# Patient Record
Sex: FEMALE | Race: WHITE | NOT HISPANIC OR LATINO | Employment: UNEMPLOYED | URBAN - METROPOLITAN AREA
[De-identification: names, ages, dates, MRNs, and addresses within clinical notes are randomized per-mention and may not be internally consistent; named-entity substitution may affect disease eponyms.]

---
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
## 2022-12-15 ENCOUNTER — TRANSFERRED RECORDS (OUTPATIENT)
Dept: HEALTH INFORMATION MANAGEMENT | Facility: CLINIC | Age: 22
End: 2022-12-15

## 2023-03-16 PROBLEM — R20.2 PARESTHESIAS: Status: ACTIVE | Noted: 2022-10-21

## 2023-03-16 PROBLEM — M79.602 PAIN OF LEFT UPPER EXTREMITY: Status: ACTIVE | Noted: 2022-10-21

## 2024-06-13 ENCOUNTER — HOSPITAL ENCOUNTER (EMERGENCY)
Facility: CLINIC | Age: 24
Discharge: HOME | End: 2024-06-14
Attending: EMERGENCY MEDICINE | Admitting: EMERGENCY MEDICINE
Payer: COMMERCIAL

## 2024-06-13 DIAGNOSIS — R51.9 NONINTRACTABLE HEADACHE, UNSPECIFIED CHRONICITY PATTERN, UNSPECIFIED HEADACHE TYPE: ICD-10-CM

## 2024-06-13 DIAGNOSIS — R07.9 CHEST PAIN, UNSPECIFIED TYPE: ICD-10-CM

## 2024-06-13 DIAGNOSIS — I95.1 ORTHOSTATIC HYPOTENSION: ICD-10-CM

## 2024-06-13 DIAGNOSIS — H61.22 IMPACTED CERUMEN OF LEFT EAR: ICD-10-CM

## 2024-06-13 LAB
ANION GAP SERPL CALCULATED.3IONS-SCNC: 14 MMOL/L (ref 7–15)
BASOPHILS # BLD AUTO: 0 10E3/UL (ref 0–0.2)
BASOPHILS NFR BLD AUTO: 0 %
BUN SERPL-MCNC: 14.3 MG/DL (ref 6–20)
CALCIUM SERPL-MCNC: 10.2 MG/DL (ref 8.6–10)
CHLORIDE SERPL-SCNC: 100 MMOL/L (ref 98–107)
CREAT SERPL-MCNC: 0.72 MG/DL (ref 0.51–0.95)
DEPRECATED HCO3 PLAS-SCNC: 20 MMOL/L (ref 22–29)
EGFRCR SERPLBLD CKD-EPI 2021: >90 ML/MIN/1.73M2
EOSINOPHIL # BLD AUTO: 0.2 10E3/UL (ref 0–0.7)
EOSINOPHIL NFR BLD AUTO: 2 %
ERYTHROCYTE [DISTWIDTH] IN BLOOD BY AUTOMATED COUNT: 11.9 % (ref 10–15)
GLUCOSE SERPL-MCNC: 92 MG/DL (ref 70–99)
HCT VFR BLD AUTO: 44 % (ref 35–47)
HGB BLD-MCNC: 14.6 G/DL (ref 11.7–15.7)
IMM GRANULOCYTES # BLD: 0 10E3/UL
IMM GRANULOCYTES NFR BLD: 0 %
LYMPHOCYTES # BLD AUTO: 2.5 10E3/UL (ref 0.8–5.3)
LYMPHOCYTES NFR BLD AUTO: 25 %
MCH RBC QN AUTO: 29.6 PG (ref 26.5–33)
MCHC RBC AUTO-ENTMCNC: 33.2 G/DL (ref 31.5–36.5)
MCV RBC AUTO: 89 FL (ref 78–100)
MONOCYTES # BLD AUTO: 1 10E3/UL (ref 0–1.3)
MONOCYTES NFR BLD AUTO: 10 %
NEUTROPHILS # BLD AUTO: 6.4 10E3/UL (ref 1.6–8.3)
NEUTROPHILS NFR BLD AUTO: 63 %
NRBC # BLD AUTO: 0 10E3/UL
NRBC BLD AUTO-RTO: 0 /100
PLATELET # BLD AUTO: 367 10E3/UL (ref 150–450)
POTASSIUM SERPL-SCNC: 4.4 MMOL/L (ref 3.4–5.3)
RBC # BLD AUTO: 4.93 10E6/UL (ref 3.8–5.2)
SODIUM SERPL-SCNC: 134 MMOL/L (ref 135–145)
WBC # BLD AUTO: 10.2 10E3/UL (ref 4–11)

## 2024-06-13 PROCEDURE — 85025 COMPLETE CBC W/AUTO DIFF WBC: CPT | Performed by: EMERGENCY MEDICINE

## 2024-06-13 PROCEDURE — 80048 BASIC METABOLIC PNL TOTAL CA: CPT | Performed by: EMERGENCY MEDICINE

## 2024-06-13 PROCEDURE — 69209 REMOVE IMPACTED EAR WAX UNI: CPT | Mod: LT

## 2024-06-13 PROCEDURE — 36415 COLL VENOUS BLD VENIPUNCTURE: CPT | Performed by: EMERGENCY MEDICINE

## 2024-06-13 PROCEDURE — 84484 ASSAY OF TROPONIN QUANT: CPT | Performed by: EMERGENCY MEDICINE

## 2024-06-13 PROCEDURE — 84702 CHORIONIC GONADOTROPIN TEST: CPT | Performed by: EMERGENCY MEDICINE

## 2024-06-13 PROCEDURE — 93005 ELECTROCARDIOGRAM TRACING: CPT

## 2024-06-13 PROCEDURE — 99285 EMERGENCY DEPT VISIT HI MDM: CPT | Mod: 25

## 2024-06-13 ASSESSMENT — COLUMBIA-SUICIDE SEVERITY RATING SCALE - C-SSRS
6. HAVE YOU EVER DONE ANYTHING, STARTED TO DO ANYTHING, OR PREPARED TO DO ANYTHING TO END YOUR LIFE?: NO
1. IN THE PAST MONTH, HAVE YOU WISHED YOU WERE DEAD OR WISHED YOU COULD GO TO SLEEP AND NOT WAKE UP?: NO
2. HAVE YOU ACTUALLY HAD ANY THOUGHTS OF KILLING YOURSELF IN THE PAST MONTH?: NO

## 2024-06-14 ENCOUNTER — APPOINTMENT (OUTPATIENT)
Dept: GENERAL RADIOLOGY | Facility: CLINIC | Age: 24
End: 2024-06-14
Attending: EMERGENCY MEDICINE
Payer: COMMERCIAL

## 2024-06-14 VITALS
DIASTOLIC BLOOD PRESSURE: 80 MMHG | SYSTOLIC BLOOD PRESSURE: 119 MMHG | WEIGHT: 168.65 LBS | BODY MASS INDEX: 27.1 KG/M2 | OXYGEN SATURATION: 99 % | HEIGHT: 66 IN | RESPIRATION RATE: 18 BRPM | TEMPERATURE: 98.1 F | HEART RATE: 81 BPM

## 2024-06-14 LAB
ATRIAL RATE - MUSE: 80 BPM
DIASTOLIC BLOOD PRESSURE - MUSE: NORMAL MMHG
GROUP A STREP BY PCR: NOT DETECTED
HCG INTACT+B SERPL-ACNC: <1 MIU/ML
INTERPRETATION ECG - MUSE: NORMAL
P AXIS - MUSE: 52 DEGREES
PR INTERVAL - MUSE: 134 MS
QRS DURATION - MUSE: 86 MS
QT - MUSE: 374 MS
QTC - MUSE: 431 MS
R AXIS - MUSE: 47 DEGREES
SYSTOLIC BLOOD PRESSURE - MUSE: NORMAL MMHG
T AXIS - MUSE: 16 DEGREES
TROPONIN T SERPL HS-MCNC: <6 NG/L
VENTRICULAR RATE- MUSE: 80 BPM

## 2024-06-14 PROCEDURE — 87651 STREP A DNA AMP PROBE: CPT | Performed by: EMERGENCY MEDICINE

## 2024-06-14 PROCEDURE — 71046 X-RAY EXAM CHEST 2 VIEWS: CPT

## 2024-06-14 PROCEDURE — 250N000013 HC RX MED GY IP 250 OP 250 PS 637: Performed by: EMERGENCY MEDICINE

## 2024-06-14 RX ORDER — IBUPROFEN 600 MG/1
600 TABLET, FILM COATED ORAL ONCE
Status: COMPLETED | OUTPATIENT
Start: 2024-06-14 | End: 2024-06-14

## 2024-06-14 RX ADMIN — IBUPROFEN 600 MG: 600 TABLET ORAL at 00:17

## 2024-06-14 NOTE — ED PROVIDER NOTES
"  Emergency Department Note      History of Present Illness     Chief Complaint  Medication Reaction      HPI  Marquita Lopez is a 23 year old female with a history of POTS who presents to the ED for concerns for a medication reaction. The patient was placed on spironolactone 1 week ago (6/7/24) for her acne. She takes 100mg of spironolactone every morning. This morning, she states she felt tachycardic when getting out of the shower. Her heart rate measured 140, and waxed and waned throughout the day. She states that her heart rate would increase with standing, and decrease when sitting down. The patient endorses constant chest tightness throughout the day, and would have difficulty breathing when her heart rate increased. She denies any fever, chills, current chest pain, dyspnea, abdominal pain, nausea, or vomiting. She has not had any recent travel, hormone use or family history early MI. Of note, the patient has had intermittent headaches for the past week as well since starting medication.     Independent Historian  Mother present at bedside and corroborates the above.     Review of External Notes  6/3/24 ED visit  Past Medical History   Medical History and Problem List  POTS   Paresthesias     Medications  Sumatriptan  Spironolactone     Physical Exam   Patient Vitals for the past 24 hrs:   BP Temp Temp src Pulse Resp SpO2 Height Weight   06/13/24 2214 (!) 139/90 98.1  F (36.7  C) Temporal 91 18 99 % 1.676 m (5' 6\") 76.5 kg (168 lb 10.4 oz)     Physical Exam  Nursing note and vitals reviewed.  Constitutional: Well nourished.   Eyes: Conjunctiva normal.  Pupils are equal, round, and reactive to light.   ENT: Nose normal. Mucous membranes pink and moist.  L. TM not visualized initially 2/2 to cerumen impaction, removed and L. TM appears normal. R. TM normal. Minimal posterior oropharyngeal erythema, no exudate. Uvula midline.   Neck: Normal range of motion.  CVS: Normal rate, regular rhythm.  Normal heart sounds. "   Pulmonary: Lungs clear to auscultation bilaterally. No wheezes/rales/rhonchi.  GI: Abdomen soft. Nontender, nondistended. No rigidity or guarding.    MSK: Moves all extremities equally and symmetrically  Neuro: Alert. Follows simple commands.  Skin: Skin is warm and dry. No rash noted.   Psychiatric: Normal affect.     Diagnostics   Lab Results   Labs Ordered and Resulted from Time of ED Arrival to Time of ED Departure   BASIC METABOLIC PANEL - Abnormal       Result Value    Sodium 134 (*)     Potassium 4.4      Chloride 100      Carbon Dioxide (CO2) 20 (*)     Anion Gap 14      Urea Nitrogen 14.3      Creatinine 0.72      GFR Estimate >90      Calcium 10.2 (*)     Glucose 92     HCG QUANTITATIVE PREGNANCY - Normal    hCG Quantitative <1     TROPONIN T, HIGH SENSITIVITY - Normal    Troponin T, High Sensitivity <6     GROUP A STREPTOCOCCUS PCR THROAT SWAB - Normal    Group A strep by PCR Not Detected     CBC WITH PLATELETS AND DIFFERENTIAL    WBC Count 10.2      RBC Count 4.93      Hemoglobin 14.6      Hematocrit 44.0      MCV 89      MCH 29.6      MCHC 33.2      RDW 11.9      Platelet Count 367      % Neutrophils 63      % Lymphocytes 25      % Monocytes 10      % Eosinophils 2      % Basophils 0      % Immature Granulocytes 0      NRBCs per 100 WBC 0      Absolute Neutrophils 6.4      Absolute Lymphocytes 2.5      Absolute Monocytes 1.0      Absolute Eosinophils 0.2      Absolute Basophils 0.0      Absolute Immature Granulocytes 0.0      Absolute NRBCs 0.0         Imaging  XR Chest 2 Views   Final Result   IMPRESSION: Negative chest.          EKG   ECG results from 06/13/24   EKG 12 lead     Value    Systolic Blood Pressure     Diastolic Blood Pressure     Ventricular Rate 80    Atrial Rate 80    AZ Interval 134    QRS Duration 86        QTc 431    P Axis 52    R AXIS 47    T Axis 16    Interpretation ECG      Sinus rhythm  Possible Left atrial enlargement  Borderline ECG  Interpreted by me at 2226.         Independent Interpretation  I independently interpreted the chest XR, no focal pneumonia   ED Course    Medications Administered  Medications   sodium chloride 0.9% BOLUS 1,000 mL (1,000 mLs Intravenous Not Given 6/14/24 0018)   ibuprofen (ADVIL/MOTRIN) tablet 600 mg (600 mg Oral $Given 6/14/24 0017)       Procedures  Procedures     Discussion of Management  None    Social Determinants of Health adding to complexity of care  None    ED Course     Medical Decision Making / Diagnosis   CMS Diagnoses: None    MIPS     None    MDM  Marquita Lopez is a 23 year old female presenting with predominantly concerns for a medication reaction to her spironolactone.  She is nontoxic, in no significant distress on arrival.  No profound electrolyte derangements or profound anemia.  She is not pregnant.  EKG without focal ischemia or underlying arrhythmia.  Delta troponin negative.  Her presentation would be atypical of ACS.  PERC negative, clinically doubt PE.  Orthostatics positive during her time in the ED.  I did recommend IV fluids though patient is declining today and expressed preference for oral intake.  She was tolerating this without difficulty at bedside.  She also reported headaches throughout the week.  She is without focal neurodeficits or meningeal signs.  No clinical evidence to suggest strep pharyngitis.  She did have concern for cerumen impaction on exam which was irrigated and no clinical evidence to suggest otitis media.  I did discuss with patient strong concern that her orthostatic hypotension is more secondary to spironolactone initiation and simultaneously complicated by her POTS history.  I recommended she hold this medication given it is a diuretic and to discuss with her dermatologist possibly lowering the dose.  Regarding her chest pain, stronger suspicion this is more related to costochondritis.  I recommended Tylenol/Motrin as needed though to closely monitor her symptoms and should symptoms worsen  or change to promptly represent.  Plan and close PCP follow-up.      Disposition  The patient was discharged.     ICD-10 Codes:    ICD-10-CM    1. Orthostatic hypotension  I95.1       2. Nonintractable headache, unspecified chronicity pattern, unspecified headache type  R51.9       3. Chest pain, unspecified type  R07.9       4. Impacted cerumen of left ear  H61.22            Discharge Medications  There are no discharge medications for this patient.    Scribe Disclosure:  I, Akilah Geller, am serving as a scribe at 12:12 AM on 6/14/2024 to document services personally performed by Eleanor Zavala DO based on my observations and the provider's statements to me.        Eleanor Zavala DO  06/14/24 0124

## 2024-06-14 NOTE — ED TRIAGE NOTES
Prescribed spironolactone last week. Since then (with her history of POTS), heart rate has been doubling and blood pressure decreasing with activity and standing.

## 2024-06-14 NOTE — DISCHARGE INSTRUCTIONS
Recommend discontinue spironolactone at this time. Discuss with your dermatologist possibly lower dose. Monitor for palpations,increasing chest pain or should symptoms worsen to represent.

## 2024-11-24 ENCOUNTER — HEALTH MAINTENANCE LETTER (OUTPATIENT)
Age: 24
End: 2024-11-24

## 2025-06-22 ENCOUNTER — APPOINTMENT (OUTPATIENT)
Dept: GENERAL RADIOLOGY | Facility: CLINIC | Age: 25
End: 2025-06-22
Attending: EMERGENCY MEDICINE
Payer: COMMERCIAL

## 2025-06-22 ENCOUNTER — HOSPITAL ENCOUNTER (EMERGENCY)
Facility: CLINIC | Age: 25
Discharge: HOME | End: 2025-06-22
Attending: EMERGENCY MEDICINE | Admitting: EMERGENCY MEDICINE
Payer: COMMERCIAL

## 2025-06-22 VITALS
TEMPERATURE: 96.9 F | WEIGHT: 163.8 LBS | BODY MASS INDEX: 26.33 KG/M2 | OXYGEN SATURATION: 97 % | HEART RATE: 68 BPM | DIASTOLIC BLOOD PRESSURE: 65 MMHG | RESPIRATION RATE: 18 BRPM | SYSTOLIC BLOOD PRESSURE: 111 MMHG | HEIGHT: 66 IN

## 2025-06-22 DIAGNOSIS — R07.9 ACUTE CHEST PAIN: ICD-10-CM

## 2025-06-22 DIAGNOSIS — R06.02 SHORTNESS OF BREATH: ICD-10-CM

## 2025-06-22 LAB
ANION GAP SERPL CALCULATED.3IONS-SCNC: 8 MMOL/L (ref 7–15)
BASOPHILS # BLD AUTO: 0.1 10E3/UL (ref 0–0.2)
BASOPHILS NFR BLD AUTO: 1 %
BUN SERPL-MCNC: 10.4 MG/DL (ref 6–20)
CALCIUM SERPL-MCNC: 8.9 MG/DL (ref 8.8–10.4)
CHLORIDE SERPL-SCNC: 108 MMOL/L (ref 98–107)
CREAT SERPL-MCNC: 0.71 MG/DL (ref 0.51–0.95)
D DIMER PPP FEU-MCNC: 0.43 UG/ML FEU (ref 0–0.5)
EGFRCR SERPLBLD CKD-EPI 2021: >90 ML/MIN/1.73M2
EOSINOPHIL # BLD AUTO: 0.3 10E3/UL (ref 0–0.7)
EOSINOPHIL NFR BLD AUTO: 3 %
ERYTHROCYTE [DISTWIDTH] IN BLOOD BY AUTOMATED COUNT: 11.9 % (ref 10–15)
GLUCOSE SERPL-MCNC: 100 MG/DL (ref 70–99)
HCG SERPL QL: NEGATIVE
HCO3 SERPL-SCNC: 21 MMOL/L (ref 22–29)
HCT VFR BLD AUTO: 37.9 % (ref 35–47)
HGB BLD-MCNC: 13 G/DL (ref 11.7–15.7)
IMM GRANULOCYTES # BLD: 0 10E3/UL
IMM GRANULOCYTES NFR BLD: 0 %
LYMPHOCYTES # BLD AUTO: 2.7 10E3/UL (ref 0.8–5.3)
LYMPHOCYTES NFR BLD AUTO: 35 %
MCH RBC QN AUTO: 29.4 PG (ref 26.5–33)
MCHC RBC AUTO-ENTMCNC: 34.3 G/DL (ref 31.5–36.5)
MCV RBC AUTO: 86 FL (ref 78–100)
MONOCYTES # BLD AUTO: 0.6 10E3/UL (ref 0–1.3)
MONOCYTES NFR BLD AUTO: 8 %
NEUTROPHILS # BLD AUTO: 4 10E3/UL (ref 1.6–8.3)
NEUTROPHILS NFR BLD AUTO: 52 %
NRBC # BLD AUTO: 0 10E3/UL
NRBC BLD AUTO-RTO: 0 /100
PLATELET # BLD AUTO: 311 10E3/UL (ref 150–450)
POTASSIUM SERPL-SCNC: 3.5 MMOL/L (ref 3.4–5.3)
RBC # BLD AUTO: 4.42 10E6/UL (ref 3.8–5.2)
SODIUM SERPL-SCNC: 137 MMOL/L (ref 135–145)
TROPONIN T SERPL HS-MCNC: <6 NG/L
TROPONIN T SERPL HS-MCNC: <6 NG/L
WBC # BLD AUTO: 7.6 10E3/UL (ref 4–11)

## 2025-06-22 PROCEDURE — 99285 EMERGENCY DEPT VISIT HI MDM: CPT | Mod: 25

## 2025-06-22 PROCEDURE — 84484 ASSAY OF TROPONIN QUANT: CPT | Performed by: EMERGENCY MEDICINE

## 2025-06-22 PROCEDURE — 93005 ELECTROCARDIOGRAM TRACING: CPT

## 2025-06-22 PROCEDURE — 96361 HYDRATE IV INFUSION ADD-ON: CPT

## 2025-06-22 PROCEDURE — 85004 AUTOMATED DIFF WBC COUNT: CPT | Performed by: EMERGENCY MEDICINE

## 2025-06-22 PROCEDURE — 250N000011 HC RX IP 250 OP 636: Performed by: EMERGENCY MEDICINE

## 2025-06-22 PROCEDURE — 80048 BASIC METABOLIC PNL TOTAL CA: CPT | Performed by: EMERGENCY MEDICINE

## 2025-06-22 PROCEDURE — 85379 FIBRIN DEGRADATION QUANT: CPT | Performed by: EMERGENCY MEDICINE

## 2025-06-22 PROCEDURE — 258N000003 HC RX IP 258 OP 636: Performed by: EMERGENCY MEDICINE

## 2025-06-22 PROCEDURE — 36415 COLL VENOUS BLD VENIPUNCTURE: CPT | Performed by: EMERGENCY MEDICINE

## 2025-06-22 PROCEDURE — 71046 X-RAY EXAM CHEST 2 VIEWS: CPT

## 2025-06-22 PROCEDURE — 84703 CHORIONIC GONADOTROPIN ASSAY: CPT | Performed by: EMERGENCY MEDICINE

## 2025-06-22 PROCEDURE — 96374 THER/PROPH/DIAG INJ IV PUSH: CPT

## 2025-06-22 RX ORDER — FLUTICASONE PROPIONATE 50 MCG
1 SPRAY, SUSPENSION (ML) NASAL DAILY
COMMUNITY

## 2025-06-22 RX ORDER — ONDANSETRON 2 MG/ML
4 INJECTION INTRAMUSCULAR; INTRAVENOUS ONCE
Status: COMPLETED | OUTPATIENT
Start: 2025-06-22 | End: 2025-06-22

## 2025-06-22 RX ORDER — RIZATRIPTAN BENZOATE 5 MG/1
5 TABLET ORAL
COMMUNITY

## 2025-06-22 RX ORDER — CETIRIZINE HYDROCHLORIDE 10 MG/1
10 TABLET ORAL DAILY
COMMUNITY

## 2025-06-22 RX ORDER — AZELASTINE 1 MG/ML
1 SPRAY, METERED NASAL 2 TIMES DAILY
COMMUNITY

## 2025-06-22 RX ADMIN — ONDANSETRON 4 MG: 2 INJECTION, SOLUTION INTRAMUSCULAR; INTRAVENOUS at 06:46

## 2025-06-22 RX ADMIN — SODIUM CHLORIDE 1000 ML: 0.9 INJECTION, SOLUTION INTRAVENOUS at 06:45

## 2025-06-22 ASSESSMENT — ACTIVITIES OF DAILY LIVING (ADL)
ADLS_ACUITY_SCORE: 41

## 2025-06-22 ASSESSMENT — COLUMBIA-SUICIDE SEVERITY RATING SCALE - C-SSRS
1. IN THE PAST MONTH, HAVE YOU WISHED YOU WERE DEAD OR WISHED YOU COULD GO TO SLEEP AND NOT WAKE UP?: NO
6. HAVE YOU EVER DONE ANYTHING, STARTED TO DO ANYTHING, OR PREPARED TO DO ANYTHING TO END YOUR LIFE?: NO
2. HAVE YOU ACTUALLY HAD ANY THOUGHTS OF KILLING YOURSELF IN THE PAST MONTH?: NO

## 2025-06-22 NOTE — DISCHARGE INSTRUCTIONS
Please monitor symptoms closely.  I would recommend ensuring that you are hydrated especially given the high temperatures outside.  Follow-up with your primary care provider in 2 to 3 days for recheck.  Return to the ER if you develop worsening pain, shortness of breath, fevers, chills or any other concerns.  We are more than happy to reevaluate you.    Discharge Instructions  Chest Pain    You have been seen today for chest pain or discomfort.  At this time, your provider has found no signs that your chest pain is due to a serious or life-threatening condition, (or you have declined more testing and/or admission to the hospital). However, sometimes there is a serious problem that does not show up right away. Your evaluation today may not be complete and you may need further testing and evaluation.     Generally, every Emergency Department visit should have a follow-up clinic visit with either a primary or a specialty clinic/provider. Please follow-up as instructed by your emergency provider today.  Return to the Emergency Department if:  Your chest pain changes, gets worse, starts to happen more often, or comes with less activity.  You are newly short of breath.  You get very weak or tired.  You pass out or faint.  You have any new symptoms, like fever, cough, numb legs, or you cough up blood.  You have anything else that worries you.    Until you follow-up with your regular provider, please do the following:  Take one aspirin daily unless you have an allergy or are told not to by your provider.  If a stress test appointment has been made, go to the appointment.  If you have questions, contact your regular provider.  Follow-up with your regular provider/clinic as directed; this is very important.    If you were given a prescription for medicine here today, be sure to read all of the information (including the package insert) that comes with your prescription.  This will include important information about the  medicine, its side effects, and any warnings that you need to know about.  The pharmacist who fills the prescription can provide more information and answer questions you may have about the medicine.  If you have questions or concerns that the pharmacist cannot address, please call or return to the Emergency Department.       Remember that you can always come back to the Emergency Department if you are not able to see your regular provider in the amount of time listed above, if you get any new symptoms, or if there is anything that worries you.

## 2025-06-22 NOTE — ED PROVIDER NOTES
"  Emergency Department Note      History of Present Illness     Chief Complaint   Chest Pain and Shortness of Breath      HPI   Marquita Lopez is a 24 year old female presenting to the ED with chest pain or shortness of breath. The patient reports waking up with mid-sternal pleuritic chest tightness with associated shortness of breath and tachycardia. Her symptoms were more noticeable on the car drive over, though are now much improved at the time of my evaluation. Marquita reports similar symptoms with her POTS secondary to being exposed to the heat.  Historically, her POTS symptoms have also included migraines, fatigue and syncope. Yesterday, she had been outside for a couple of hours around mid-afternoon, acknowledging sun/heat exposure. She felt fine throughout the day; although, she does not going to bed feeling tired. Patient denies radiation or new muscle pain, calf pain/cramping/swelling.  No personal history of CAD, nor VTE.  Family history notable for hypertrophic cardiomyopathy in father.    Independent Historian   None    Review of External Notes   I reviewed an office visit note from 11/11/ 24 with cardiology.  Patient has a family history of hypertropic cardiomyopathy from her father's side. She had a transthoracic echocardiogram which did not show evidence of HCM.    Past Medical History     Medical History and Problem List   POTS   Paresthesias   Migraines     Medications   Aripiprazole  Isibloom  Lamotrigine  Propranolol  Sertraline  Trazodone    Surgical History   History reviewed. No pertinent surgical history.     Physical Exam     Patient Vitals for the past 24 hrs:   BP Temp Temp src Pulse Resp SpO2 Height Weight   06/22/25 0830 111/65 -- -- 68 -- 97 % -- --   06/22/25 0730 123/79 -- -- 68 -- 98 % -- --   06/22/25 0700 124/76 -- -- 65 -- 100 % -- --   06/22/25 0611 (!) 136/93 96.9  F (36.1  C) Temporal 82 18 100 % 1.676 m (5' 6\") 74.3 kg (163 lb 12.8 oz)     Physical Exam  General:              " Well-nourished              Speaking in full sentences  Eyes:              Conjunctiva without injection or scleral icterus  ENT:              Moist mucous membranes              Nares patent              Pinnae normal  Neck:              Full ROM              No stiffness appreciated  Resp:              Lungs CTAB              No crackles, wheezing or audible rubs              Good air movement  CV:                    Normal rate, regular rhythm              S1 and S2 present              No murmur, gallop or rub  GI:              BS present              Abdomen soft without distention              Non-tender to light and deep palpation              No guarding or rebound tenderness  Skin:              Warm, dry, well perfused              No rashes or open wounds on exposed skin  MSK:              Moves all extremities              No focal deformities or swelling              Some TTP over anterior chest wall  Neuro:              Alert              Answers questions appropriately              Moves all extremities equally              Gait stable  Psych:              Normal affect, normal mood    Diagnostics     Lab Results   Labs Ordered and Resulted from Time of ED Arrival to Time of ED Departure   BASIC METABOLIC PANEL - Abnormal       Result Value    Sodium 137      Potassium 3.5      Chloride 108 (*)     Carbon Dioxide (CO2) 21 (*)     Anion Gap 8      Urea Nitrogen 10.4      Creatinine 0.71      GFR Estimate >90      Calcium 8.9      Glucose 100 (*)    TROPONIN T, HIGH SENSITIVITY - Normal    Troponin T, High Sensitivity <6     D DIMER QUANTITATIVE - Normal    D-Dimer Quantitative 0.43     HCG QUALITATIVE PREGNANCY - Normal    hCG Serum Qualitative Negative     TROPONIN T, HIGH SENSITIVITY - Normal    Troponin T, High Sensitivity <6     CBC WITH PLATELETS AND DIFFERENTIAL    WBC Count 7.6      RBC Count 4.42      Hemoglobin 13.0      Hematocrit 37.9      MCV 86      MCH 29.4      MCHC 34.3      RDW 11.9       Platelet Count 311      % Neutrophils 52      % Lymphocytes 35      % Monocytes 8      % Eosinophils 3      % Basophils 1      % Immature Granulocytes 0      NRBCs per 100 WBC 0      Absolute Neutrophils 4.0      Absolute Lymphocytes 2.7      Absolute Monocytes 0.6      Absolute Eosinophils 0.3      Absolute Basophils 0.1      Absolute Immature Granulocytes 0.0      Absolute NRBCs 0.0         Imaging   Chest XR,  PA & LAT   Final Result   IMPRESSION: Negative chest.          EKG   ECG taken at 0613, ECG read at 0722  Normal sinus rhythm   Normal ECG   No significant changes as compared to prior, dated 06/13/24.  Rate 80 bpm. IN interval 146 ms. QRS duration 86 ms. QT/QTc 388/447 ms. P-R-T axes 31 61 33.    Independent Interpretation   CXR: No pneumothorax or infiltrate.    ED Course      Medications Administered   Medications   sodium chloride 0.9% BOLUS 1,000 mL (0 mLs Intravenous Stopped 6/22/25 0904)   ondansetron (ZOFRAN) injection 4 mg (4 mg Intravenous $Given 6/22/25 0646)       Procedures   Procedures     Discussion of Management   None    ED Course   ED Course as of 06/22/25 1325   Sun Jun 22, 2025   0726 I obtained the history and examined the patient as noted above.     0903 I rechecked the patient and explained findings; patient is discharged.        Additional Documentation  None    Medical Decision Making / Diagnosis     CMS Diagnoses: None    MIPS   None       MDM   Marquita Lopez is a 24-year-old female with a PMH significant for POTS syndrome who presents to the emergency department for evaluation of chest pain and shortness of breath.  VS on presentation reveal mildly elevated BP, which improved on recheck.  Differential diagnosis includes ACS, PE, pneumothorax, pneumonia, aortic dissection, exacerbation of underlying pots disease, structural heart disease, dehydration, among others.  Presently, I suspect patient's symptoms may be multifactorial.  She does acknowledge a history of POTS, oftentimes  exacerbated by increased ambient temperatures.  She acknowledges being outside yesterday with elevated temperatures approaching 100 degrees, which may be a contributing factor.  Following IV fluids, the patient was feeling markedly improved.  Her EKG demonstrates sinus rhythm without findings of acute ischemia or significant changes compared with previous.  Her initial and repeat high-sensitivity troponin have returned undetectable.  Pulmonary embolism was also considered though unlikely as patient is low risk utilizing Wells criteria and D-dimer returned within normal limits.  Chest x-ray is negative for pneumothorax or pneumonia.  History evaluation not suggestive of acute aortic dissection and I do feel CT imaging can be deferred safely.  She does have some tenderness to palpation over her anterior chest wall, which may implicate a component of musculoskeletal pathology as well.  Given the above workup, improvement in symptoms and stable vital signs I feel she is stable for discharge from the ER.  Will plan close monitoring of symptoms as an outpatient, follow-up with PCP in 2 to 3 days for recheck or return to the ER with any new or troubling symptoms.  Patient comfortable with outlined plan of care and questions have been answered prior to discharge.    Disposition   The patient was discharged.     Diagnosis     ICD-10-CM    1. Acute chest pain  R07.9       2. Shortness of breath  R06.02          Scribe Disclosure:  I, Martha Christie, am serving as a scribe at 7:39 AM on 6/22/2025 to document services personally performed by Sujit Posada MD based on my observations and the provider's statements to me.        Sujit Posada MD  06/22/25 [id number]

## 2025-06-22 NOTE — Clinical Note
Marquita Lopez was seen and treated in our emergency department on 6/22/2025.         Sincerely,     [facility]

## 2025-06-22 NOTE — ED NOTES
"I saw this patient briefly before shift change.  Plan for full evaluation by the 7 AM MD.    In short this is a 24-year-old female with history below who presents with chest pain, shortness of breath, headache and nausea.  She was outside in the 100+ heat index temperature yesterday.  She has not actually vomited.  She describes her chest pain as midsternal.  She has pain with deep breathing.  She does take birth control.  Mom states there is a family history of clots.  No recent fevers.  EKG reviewed and unremarkable.  Sinus rhythm with normal rate, no ectopy and no ischemic changes.  QTc is 447.  Will order liter IV fluid given history of POTS and outside exposure with concern for dehydration.  Labs including dimer to rule out pulmonary embolism also ordered.  Nausea meds provided.  Family comfortable with this plan.    Past Medical History:   Diagnosis Date    Allergic rhinitis     Migraine     POTS (postural orthostatic tachycardia syndrome)      Patient Vitals for the past 24 hrs:   BP Temp Temp src Pulse Resp SpO2 Height Weight   06/22/25 0611 (!) 136/93 96.9  F (36.1  C) Temporal 82 18 100 % 1.676 m (5' 6\") 74.3 kg (163 lb 12.8 oz)          Martell Coleman MD  06/22/25 0628    "

## 2025-06-23 LAB
ATRIAL RATE - MUSE: 80 BPM
DIASTOLIC BLOOD PRESSURE - MUSE: NORMAL MMHG
INTERPRETATION ECG - MUSE: NORMAL
P AXIS - MUSE: 31 DEGREES
PR INTERVAL - MUSE: 146 MS
QRS DURATION - MUSE: 86 MS
QT - MUSE: 388 MS
QTC - MUSE: 447 MS
R AXIS - MUSE: 61 DEGREES
SYSTOLIC BLOOD PRESSURE - MUSE: NORMAL MMHG
T AXIS - MUSE: 33 DEGREES
VENTRICULAR RATE- MUSE: 80 BPM